# Patient Record
Sex: MALE
[De-identification: names, ages, dates, MRNs, and addresses within clinical notes are randomized per-mention and may not be internally consistent; named-entity substitution may affect disease eponyms.]

---

## 2019-02-05 ENCOUNTER — HOSPITAL ENCOUNTER (EMERGENCY)
Dept: HOSPITAL 97 - ER | Age: 24
Discharge: HOME | End: 2019-02-05
Payer: SELF-PAY

## 2019-02-05 DIAGNOSIS — L50.9: Primary | ICD-10-CM

## 2019-02-05 PROCEDURE — 99283 EMERGENCY DEPT VISIT LOW MDM: CPT

## 2019-02-05 NOTE — ER
Nurse's Notes                                                                                     

 [facility]                                                                

Name: Ramiro Gallego                                                                              

Age: 23 yrs                                                                                       

Sex: Male                                                                                         

: 1995                                                                                   

MRN: [id number]                                                                                   

Arrival Date: 2019                                                                          

Time: 16:12                                                                                       

Account#: [id number]                                                                            

Bed 19                                                                                            

Private MD:                                                                                       

Diagnosis: Urticaria                                                                              

                                                                                                  

Presentation:                                                                                     

                                                                                             

16:19 Presenting complaint: Patient states: Itchy rash that started yesterday all over body.  aj  

      Transition of care: patient was not received from another setting of care. Onset: The       

      symptoms/episode began/occurred suddenly. Anaphylaxis evaluation, the patient reports       

      or I have noted the following symptoms which indicate a significant risk of                 

      anaphylaxis:. Onset of symptoms was 2019. Risk Assessment: Do you want to      

      hurt yourself or someone else? Patient reports no desire to harm self or others.            

      Initial Sepsis Screen: Does the patient meet any 2 criteria? No. Patient's initial          

      sepsis screen is negative. Does the patient have a suspected source of infection? No.       

      Patient's initial sepsis screen is negative. Care prior to arrival: None.                   

16:19 Method Of Arrival: Ambulatory                                                             

16:19 Acuity: COOPER 4                                                                             

                                                                                                  

Triage Assessment:                                                                                

16:23 General: Appears in no apparent distress. comfortable, Behavior is calm, cooperative,   aj  

      appropriate for age. Pain: Denies pain. Neuro: Level of Consciousness is awake, alert,      

      obeys commands, Oriented to person, place, time, situation, Appropriate for age.            

      Respiratory: Airway is patent Respiratory effort is even, unlabored, Respiratory            

      pattern is regular, symmetrical. Derm: Skin is pink, warm \T\ dry. Rash noted that is       

      itchy, red.                                                                                 

                                                                                                  

Historical:                                                                                       

- Allergies:                                                                                      

16:23 No Known Allergies;                                                                     aj  

- Home Meds:                                                                                      

16:23 None [Active];                                                                          aj  

- PMHx:                                                                                           

16:23 None;                                                                                   aj  

                                                                                                  

- Immunization history:: Adult Immunizations up to date.                                          

- Social history:: Smoking status: Patient/guardian denies using tobacco.                         

- Ebola Screening: : Patient negative for fever greater than or equal to 101.5 degrees            

  Fahrenheit, and additional compatible Ebola Virus Disease symptoms Patient denies               

  exposure to infectious person Patient denies travel to an Ebola-affected area in the            

  21 days before illness onset No symptoms or risks identified at this time.                      

                                                                                                  

                                                                                                  

Screenin:37 Abuse screen: Denies threats or abuse. Denies injuries from another. Nutritional        sv  

      screening: No deficits noted. Tuberculosis screening: Fall Risk None identified.            

                                                                                                  

Assessment:                                                                                       

16:35 General: Appears in no apparent distress. comfortable, well developed, Behavior is      sv  

      calm, cooperative, appropriate for age. Pain: Denies pain. Neuro: Level of                  

      Consciousness is awake, alert, obeys commands, Oriented to person, place, time,             

      situation, Moves all extremities. Full function Speech is normal. Respiratory: Airway       

      is patent Respiratory effort is even, unlabored, Respiratory pattern is regular,            

      symmetrical. Derm: Rash noted that is itchy, urticaria, on diffuse.                         

                                                                                                  

Vital Signs:                                                                                      

16:23  / 92; Pulse 79; Resp 20; Temp 97.5; Pulse Ox 99% on R/A; Weight 78.93 kg; Height aj  

      5 ft. 11 in. (180.34 cm);                                                                   

16:23 Body Mass Index 24.27 (78.93 kg, 180.34 cm)                                             aj  

                                                                                                  

ED Course:                                                                                        

16:12 Patient arrived in ED.                                                                  rg4 

16:17 Rupa Carter FNP-C is [facility].                                                        kb  

16:17 Art Aldrich MD is Attending Physician.                                                kb  

16:22 Triage completed.                                                                       aj  

16:23 Arm band placed on left wrist. Patient placed in waiting room, Patient notified of wait aj  

      time.                                                                                       

16:33 Joanna Dennison, RN is Primary Nurse.                                                  sv  

16:37 Patient has correct armband on for positive identification. Bed in low position. Door   sv  

      closed.                                                                                     

17:07 No provider procedures requiring assistance completed. Patient did not have IV access   sv  

      during this emergency room visit.                                                           

                                                                                                  

Administered Medications:                                                                         

16:36 Drug: Pepcid 20 mg Route: PO;                                                           sv  

17:00 Follow up: Response: No adverse reaction                                                sv  

16:37 Drug: predniSONE 40 mg Route: PO;                                                       sv  

17:00 Follow up: Response: No adverse reaction                                                sv  

                                                                                                  

                                                                                                  

Outcome:                                                                                          

16:48 Discharge ordered by MD.                                                                kb  

17:07 Patient left the ED.                                                                    sv  

17:07 Discharged to home ambulatory.                                                          sv  

17:07 Condition: stable                                                                           

17:07 Discharge instructions given to patient, Instructed on discharge instructions, follow       

      up and referral plans. medication usage, Demonstrated understanding of instructions,        

      follow-up care, medications, Prescriptions given X 3.                                       

                                                                                                  

Signatures:                                                                                       

Rupa Carter FNP-C FNP-Joanna Crews RN RN sv Myers, Amanda, RN RN aj Garcia, Rubi                                 rg4                                                  

                                                                                                  

**************************************************************************************************

## 2019-02-05 NOTE — EDPHYS
Physician Documentation                                                                           

 [facility]                                                                

Name: Ramiro Gallego                                                                              

Age: 23 yrs                                                                                       

Sex: Male                                                                                         

: 1995                                                                                   

MRN: [id number]                                                                                   

Arrival Date: 2019                                                                          

Time: 16:12                                                                                       

Account#: [id number]                                                                            

Bed 19                                                                                            

Private MD:                                                                                       

ED Physician Art Aldrich                                                                         

HPI:                                                                                              

                                                                                             

16:40 This 23 yrs old  Male presents to ER via Ambulatory with complaints of Allergic kb  

      Reaction.                                                                                   

16:40 The patient presents with rash, that is diffuse. Onset: The symptoms/episode            kb  

      began/occurred yesterday. Associated signs and symptoms: Pertinent positives: hives,        

      Pertinent negatives: abdominal pain, Altered mental status chest pain, dysphagia,           

      fever, headache, Light headed nausea, rash, shortness of breath, swelling, Syncope          

      vomiting. 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         

      with something at work. At home the patient or guardian has treated the symptoms with       

      nothing. Severity of symptoms: At their worst the symptoms were moderate in the             

      emergency department the symptoms are unchanged. The patient has not experienced            

      similar symptoms in the past. The patient has not recently seen a physician.                

                                                                                                  

Historical:                                                                                       

- Allergies:                                                                                      

16:23 No Known Allergies;                                                                     aj  

- Home Meds:                                                                                      

16:23 None [Active];                                                                          aj  

- PMHx:                                                                                           

16:23 None;                                                                                   aj  

                                                                                                  

- Immunization history:: Adult Immunizations up to date.                                          

- Social history:: Smoking status: Patient/guardian denies using tobacco.                         

- Ebola Screening: : Patient negative for fever greater than or equal to 101.5 degrees            

  Fahrenheit, and additional compatible Ebola Virus Disease symptoms Patient denies               

  exposure to infectious person Patient denies travel to an Ebola-affected area in the            

  21 days before illness onset No symptoms or risks identified at this time.                      

                                                                                                  

                                                                                                  

ROS:                                                                                              

16:42 Constitutional: Negative for fever, chills, and weight loss, Cardiovascular: Negative   kb  

      for chest pain, palpitations, and edema, Respiratory: Negative for shortness of breath,     

      cough, wheezing, and pleuritic chest pain, Abdomen/GI: Negative for abdominal pain,         

      nausea, vomiting, diarrhea, and constipation, Back: Negative for injury and pain, :       

      Negative for injury, bleeding, discharge, and swelling, MS/Extremity: Negative for          

      injury and deformity, Neuro: Negative for headache, weakness, numbness, tingling, and       

      seizure.                                                                                    

16:42 Skin: Positive for rash, diffusely.                                                         

                                                                                                  

Exam:                                                                                             

16:42 Constitutional:  This is a well developed, well nourished patient who is awake, alert,  kb  

      and in no acute distress. Head/Face:  Normocephalic, atraumatic. Chest/axilla:  Normal      

      chest wall appearance and motion.  Nontender with no deformity.  No lesions are             

      appreciated. Cardiovascular:  Regular rate and rhythm with a normal S1 and S2.  No          

      gallops, murmurs, or rubs.  Normal PMI, no JVD.  No pulse deficits. Respiratory:  Lungs     

      have equal breath sounds bilaterally, clear to auscultation and percussion.  No rales,      

      rhonchi or wheezes noted.  No increased work of breathing, no retractions or nasal          

      flaring. Abdomen/GI:  Soft, non-tender, with normal bowel sounds.  No distension or         

      tympany.  No guarding or rebound.  No evidence of tenderness throughout. MS/ Extremity:     

       Pulses equal, no cyanosis.  Neurovascular intact.  Full, normal range of motion.           

      Neuro:  Awake and alert, GCS 15, oriented to person, place, time, and situation.            

      Cranial nerves II-XII grossly intact.  Motor strength 5/5 in all extremities.  Sensory      

      grossly intact.  Cerebellar exam normal.  Normal gait.                                      

16:42 Skin: consistent with  urticaria, and is diffusely located.                                 

                                                                                                  

Vital Signs:                                                                                      

16:23  / 92; Pulse 79; Resp 20; Temp 97.5; Pulse Ox 99% on R/A; Weight 78.93 kg; Height aj  

      5 ft. 11 in. (180.34 cm);                                                                   

16:23 Body Mass Index 24.27 (78.93 kg, 180.34 cm)                                               

                                                                                                  

MDM:                                                                                              

16:25 Patient medically screened.                                                             kb  

16:42 Data reviewed: vital signs, nurses notes. Data interpreted: Pulse oximetry: on room air kb  

      is 99 %. Interpretation: normal. Counseling: I had a detailed discussion with the           

      patient and/or guardian regarding: the historical points, exam findings, and any            

      diagnostic results supporting the discharge/admit diagnosis, the need for outpatient        

      follow up, a family practitioner, to return to the emergency department if symptoms         

      worsen or persist or if there are any questions or concerns that arise at home.             

                                                                                                  

Administered Medications:                                                                         

16:36 Drug: Pepcid 20 mg Route: PO;                                                           sv  

17:00 Follow up: Response: No adverse reaction                                                sv  

16:37 Drug: predniSONE 40 mg Route: PO;                                                       sv  

17:00 Follow up: Response: No adverse reaction                                                sv  

                                                                                                  

                                                                                                  

Disposition:                                                                                      

17:54 Co-signature as Attending Physician, Art Aldrich MD.                                    rn  

                                                                                                  

Disposition:                                                                                      

19 16:48 Discharged to Home. Impression: Urticaria.                                         

- Condition is Stable.                                                                            

- Discharge Instructions: Hives, Easy-to-Read.                                                    

- Prescriptions for Pepcid 20 mg Oral Tablet - take 1 tablet by ORAL route every 12               

  hours for 5 days; 10 tablet. Prednisone 20 mg Oral Tablet - take 1 tablet by ORAL               

  route once daily for 5 days; 5 tablet.                                                          

- Medication Reconciliation Form, Thank You Letter, Antibiotic Education, Prescription            

  Opioid Use form.                                                                                

- Follow up: Emergency Department; When: As needed; Reason: Worsening of condition.               

  Follow up: Private Physician; When: 2 - 3 days; Reason: Recheck today's complaints,             

  Continuance of care, Re-evaluation by your physician.                                           

                                                                                                  

                                                                                                  

                                                                                                  

Signatures:                                                                                       

Rupa Carter, CRISTAL-C                 CRISTAL-Joanna Cerws RN RN sv Myers, Amanda, RN RN aj Nieto, Roman, MD MD   rn                                                   

                                                                                                  

Corrections: (The following items were deleted from the chart)                                    

17:07 16:48 2019 16:48 Discharged to Home. Impression: Urticaria. Condition is Stable.  sv  

      Forms are Medication Reconciliation Form, Thank You Letter, Antibiotic Education,           

      Prescription Opioid Use. Follow up: Emergency Department; When: As needed; Reason:          

      Worsening of condition. Follow up: Private Physician; When: 2 - 3 days; Reason: Recheck     

      today's complaints, Continuance of care, Re-evaluation by your physician. kb                

                                                                                                  

**************************************************************************************************

## 2020-12-27 NOTE — ER
Nurse's Notes                                                                                     

 [facility]                                                                 

Name: Ramiro Gallego                                                                              

Age: 25 yrs                                                                                       

Sex: Male                                                                                         

: 1995                                                                                   

MRN: [id number]                                                                                   

Arrival Date: 2020                                                                          

Time: 11:31                                                                                       

Account#: [id number]                                                                            

Bed 30                                                                                            

Private MD:                                                                                       

Diagnosis:                                                                                        

                                                                                                  

Presentation:                                                                                     

                                                                                             

12:50 Chief complaint: Patient states: Abscess LL tooth x 2 weeks. Lips feels numb now,       ca1 

      swelling and pain on L side of face. Coronavirus screen: Client denies travel out of        

      the [place] in the last 14 days. At this time, the client does not indicate any symptoms       

      associated with coronavirus-19. Ebola Screen: Patient negative for fever greater than       

      or equal to 101.5 degrees Fahrenheit, and additional compatible Ebola Virus Disease         

      symptoms Patient denies exposure to infectious person. Patient denies travel to an          

      Ebola-affected area in the 21 days before illness onset. No symptoms or risks               

      identified at this time. Initial Sepsis Screen: Does the patient meet any 2 criteria?       

      No. Patient's initial sepsis screen is negative. Does the patient have a suspected          

      source of infection? No. Patient's initial sepsis screen is negative. Risk Assessment:      

      Do you want to hurt yourself or someone else? Patient reports no desire to harm self or     

      others. Onset of symptoms was 2020.                                            

12:50 Method Of Arrival: Ambulatory                                                           ca1 

12:50 Acuity: COOPER 4                                                                           ca1 

                                                                                                  

Historical:                                                                                       

- Allergies:                                                                                      

12:52 No Known Allergies;                                                                     ca1 

- Home Meds:                                                                                      

12:52 None [Active];                                                                          ca1 

- PMHx:                                                                                           

12:52 None;                                                                                   ca1 

                                                                                                  

- Immunization history:: Adult Immunizations up to date, Flu vaccine is not up to date.           

- Social history:: Smoking status: Patient reports the use of cigarette tobacco                   

  products, denies chronic smoking, but will smoke occasionally.                                  

                                                                                                  

                                                                                                  

Vital Signs:                                                                                      

12:50  / 96; Pulse 61; Resp 16 S; Temp 98.1(TE); Pulse Ox 97% on R/A; Weight 79.38 kg   ca1 

      (R); Height 5 ft. 11 in. (180.34 cm) (R); Pain 7/10;                                        

12:50 Body Mass Index 24.41 (79.38 kg, 180.34 cm)                                             ca1 

                                                                                                  

ED Course:                                                                                        

11:31 Patient arrived in ED.                                                                  as  

12:51 Triage completed.                                                                       ca1 

12:52 Arm band placed on right wrist.                                                         ca1 

15:02 Berenice Noel, RN is Primary Nurse.                                                   iw  

15:02 Freddy Lundy PA is PHCP.                                                                cp  

15:02 Art Aldrich MD is Attending Physician.                                                cp  

                                                                                                  

Administered Medications:                                                                         

No medications were administered                                                                  

                                                                                                  

                                                                                                  

Outcome:                                                                                          

15:02 Eloped                                                                                  iw  

15:03 Patient left the ED.                                                                    iw  

                                                                                                  

Signatures:                                                                                       

Loree Leon                             as                                                   

Berenice Noel, RN                     RN   iw                                                   

Freddy Lundy PA                         PA   cp                                                   

Margaret Child RN                        RN   ca1                                                  

                                                                                                  

Corrections: (The following items were deleted from the chart)                                    

12:53 12:50 Resp 16bpm; Spontaneous; 79.38 kg Reported; Height 5 ft. 11 in. Reported; BMI:    ca1 

      24.4; Pain 7/10; ca1                                                                        

                                                                                                  

**************************************************************************************************

## 2020-12-28 NOTE — EDPHYS
Physician Documentation                                                                           

 [facility]                                                                 

Name: Ramiro Gallego                                                                              

Age: 25 yrs                                                                                       

Sex: Male                                                                                         

: 1995                                                                                   

MRN: [id number]                                                                                   

Arrival Date: 2020                                                                          

Time: 18:45                                                                                       

Account#: [id number]                                                                            

Bed 28                                                                                            

Private MD:                                                                                       

ED Physician Freddy Jacobson                                                                      

HPI:                                                                                              

                                                                                             

21:06 This 25 yrs old  Male presents to ER via Ambulatory with complaints of          cp  

      Toothache.                                                                                  

21:06 The patient presents with pain. The problem is located in the left lower jaw. Onset:    cp  

      The symptoms/episode began/occurred 3 week(s) ago. Duration: The symptoms are               

      continuous, and are steadily getting worse.                                                 

21:06 Associated signs and symptoms: Pertinent negatives: dysphagia, fever, inability to eat. cp  

      Severity of symptoms: in the emergency department the symptoms are unchanged, despite       

      home interventions.                                                                         

                                                                                                  

Historical:                                                                                       

- Allergies:                                                                                      

19:04 No Known Allergies;                                                                     ll1 

- PMHx:                                                                                           

19:04 pectus exavatum;                                                                        ll1 

19:05 scoliosis;                                                                              ll1 

- PSHx:                                                                                           

19:05 Appendectomy;                                                                           ll1 

                                                                                                  

- Immunization history:: Flu vaccine is not up to date.                                           

- Social history:: Smoking status: Patient reports the use of cigarette tobacco                   

  products, denies chronic smoking, but will smoke occasionally.                                  

                                                                                                  

                                                                                                  

ROS:                                                                                              

21:07 Constitutional: Negative for body aches, chills, fever, poor PO intake.                 cp  

21:07 ENT: Positive for dental pain, Negative for ear pain, sore throat, difficulty           cp  

      swallowing, difficulty handling secretions.                                                 

21:07 Respiratory: Negative for cough, shortness of breath, wheezing.                             

21:07 Abdomen/GI: Negative for abdominal pain, nausea, vomiting, and diarrhea.                    

21:07 Skin: Negative for rash.                                                                    

21:07 Neuro: Negative for altered mental status, headache.                                        

21:07 All other systems are negative.                                                             

                                                                                                  

Exam:                                                                                             

21:07 Constitutional: The patient appears in no acute distress, alert, awake, non-toxic, well cp  

      developed, well nourished.                                                                  

21:07 Head/face: Noted is swelling, that is mild, of the  left lower jaw.                         

21:07 ENT: External ear(s): are unremarkable, Nose: is normal, Mouth: Lips: moist, Oral           

      mucosa: moist, Posterior pharynx: Airway: no evidence of obstruction, patent, Tonsils:      

      are normal in appearance, Dental exam: abscess, is not appreciated, dental caries, that     

      is moderate, diffusely, pain, that is mild, specifically in the  lower left second          

      bicuspid (#20), Voice: is normal.                                                           

21:07 Neck: ROM/movement: is normal, is supple, without pain, no range of motions limitations.    

21:07 Chest/axilla: Inspection: normal.                                                           

21:07 Cardiovascular: Rate: bradycardic, Rhythm: regular.                                     cp  

21:07 Respiratory: the patient does not display signs of respiratory distress,  Respirations:     

      normal, no use of accessory muscles, labored breathing, is not present.                     

21:07 Skin: no rash present.                                                                      

                                                                                                  

Vital Signs:                                                                                      

19:05  / 70; Pulse 69; Resp 17; Temp 98.5; Pulse Ox 99% ; Weight 79.38 kg; Height 5 ft. ll1 

      11 in. (180.34 cm); Pain 6/10;                                                              

20:33 BP 99 / 60 LA (auto/reg); Pulse 54; Pulse Ox 100% on R/A; Pain 6/10;                    jp3 

19:05 Body Mass Index 24.41 (79.38 kg, 180.34 cm)                                             ll1 

                                                                                                  

MDM:                                                                                              

20:42 Patient medically screened.                                                             cp  

21:10 Differential diagnosis: dental caries, dental abscess, pericoronitis.                   cp  

21:10 Data reviewed: vital signs, nurses notes, and as a result, I will discharge patient.    cp  

      Counseling: I had a detailed discussion with the patient and/or guardian regarding: the     

      historical points, exam findings, and any diagnostic results supporting the                 

      discharge/admit diagnosis, the need for outpatient follow up, for definitive care, a        

      dentist, to return to the emergency department if symptoms worsen or persist or if          

      there are any questions or concerns that arise at home.                                     

                                                                                                  

Administered Medications:                                                                         

21:11 Drug: Ibuprofen 800 mg Route: PO;                                                       sg  

21:43 Follow up: Response: No adverse reaction                                                sg  

21:12 Drug: Clindamycin 600 mg Route: PO;                                                     sg  

21:20 Follow up: Response: No adverse reaction                                                sg  

                                                                                                  

                                                                                                  

Disposition:                                                                                      

21:25 Chart complete.                                                                         cp  

22:50 Co-signature as Attending Physician, Freddy Jacobson MD I agree with the assessment and  joanna 

      plan of care.                                                                               

                                                                                                  

Disposition:                                                                                      

20 21:10 Discharged to Home. Impression: Disorder of teeth and supporting structures,       

  unspecified.                                                                                    

- Condition is Stable.                                                                            

- Discharge Instructions: Dental Pain.                                                            

- Prescriptions for Clindamycin HCl 300 mg Oral Capsule - take 1 capsule by ORAL route            

  every 6 hours for 10 days; 40 capsule. Ibuprofen 800 mg Oral Tablet - take 1 tablet             

  by ORAL route every 8 hours As needed take with food; 30 tablet.                                

- Work release form, Medication Reconciliation Form, Thank You Letter, Antibiotic                 

  Education, Prescription Opioid Use form.                                                        

- Follow up: Private Physician; When: 2 - 3 days; Reason: Recheck today's complaints.             

- Problem is new.                                                                                 

- Symptoms have improved.                                                                         

                                                                                                  

                                                                                                  

                                                                                                  

Signatures:                                                                                       

Nathan Pan RN                         RN   Freddy Rawls MD MD cha Page, Corey, PA PA   cp                                                   

Destini Conrad RN                       RN   ll1                                                  

                                                                                                  

Corrections: (The following items were deleted from the chart)                                    

21:21 21:10 2020 21:10 Discharged to Home. Impression: Disorder of teeth and supporting sg  

      structures, unspecified. Condition is Stable. Forms are Medication Reconciliation Form,     

      Thank You Letter, Antibiotic Education, Prescription Opioid Use. Follow up: Private         

      Physician; When: 2 - 3 days; Reason: Recheck today's complaints. Problem is new.            

      Symptoms have improved. cp                                                                  

                                                                                                  

**************************************************************************************************

## 2020-12-28 NOTE — ER
Nurse's Notes                                                                                     

 [facility]                                                                 

Name: Ramiro Gallego                                                                              

Age: 25 yrs                                                                                       

Sex: Male                                                                                         

: 1995                                                                                   

MRN: [id number]                                                                                   

Arrival Date: 2020                                                                          

Time: 18:45                                                                                       

Account#: [id number]                                                                            

Bed 28                                                                                            

Private MD:                                                                                       

Diagnosis: Disorder of teeth and supporting structures, unspecified                               

                                                                                                  

Presentation:                                                                                     

                                                                                             

19:05 Chief complaint: Patient states: Left lower jaw pain and swelling for 2 days. No fever. ll1 

      Lower jaw is swollen on the outside. Coronavirus screen: Client denies travel out of        

      the [place] in the last 14 days. At this time, the client does not indicate any symptoms       

      associated with coronavirus-19. Ebola Screen: Patient denies travel to an                   

      Ebola-affected area in the 21 days before illness onset. Initial Sepsis Screen: Does        

      the patient meet any 2 criteria? No. Patient's initial sepsis screen is negative. Does      

      the patient have a suspected source of infection? Yes: Other: teeth. Risk Assessment:       

      Do you want to hurt yourself or someone else? Patient reports no desire to harm self or     

      others. Onset of symptoms was 2020.                                            

19:05 Method Of Arrival: Ambulatory                                                           ll1 

19:05 Acuity: COOPER 4                                                                           ll1 

                                                                                                  

Historical:                                                                                       

- Allergies:                                                                                      

19:04 No Known Allergies;                                                                     ll1 

- PMHx:                                                                                           

19:04 pectus exavatum;                                                                        ll1 

19:05 scoliosis;                                                                              ll1 

- PSHx:                                                                                           

19:05 Appendectomy;                                                                           ll1 

                                                                                                  

- Immunization history:: Flu vaccine is not up to date.                                           

- Social history:: Smoking status: Patient reports the use of cigarette tobacco                   

  products, denies chronic smoking, but will smoke occasionally.                                  

                                                                                                  

                                                                                                  

Screenin:35 Abuse screen: Denies threats or abuse. Denies injuries from another. Nutritional        sg  

      screening: No deficits noted. Tuberculosis screening: No symptoms or risk factors           

      identified. Never had TB. Fall Risk None identified.                                        

                                                                                                  

Assessment:                                                                                       

20:35 General: Appears in no apparent distress. well groomed, well developed, well nourished, sg  

      Behavior is calm, cooperative, appropriate for age. Pain: Complains of pain in right        

      jaw Quality of pain is described as aching. Neuro: Level of Consciousness is awake,         

      alert, obeys commands, Oriented to person, place, time, Speech is normal, Facial            

      symmetry appears normal. Cardiovascular: Patient's skin is warm and dry. Chest pain is      

      denied. Respiratory: Airway is patent Respiratory effort is even, unlabored,                

      Respiratory pattern is regular, symmetrical. GI: No signs and/or symptoms were reported     

      involving the gastrointestinal system. : No signs and/or symptoms were reported           

      regarding the genitourinary system. Derm: Skin is pink, warm \T\ dry. Musculoskeletal:      

      Circulation, motion, and sensation intact. Range of motion: intact in all extremities.      

                                                                                                  

Vital Signs:                                                                                      

19:05  / 70; Pulse 69; Resp 17; Temp 98.5; Pulse Ox 99% ; Weight 79.38 kg; Height 5 ft. ll1 

      11 in. (180.34 cm); Pain 6/10;                                                              

20:33 BP 99 / 60 LA (auto/reg); Pulse 54; Pulse Ox 100% on R/A; Pain 6/10;                    jp3 

19:05 Body Mass Index 24.41 (79.38 kg, 180.34 cm)                                             ll1 

                                                                                                  

ED Course:                                                                                        

18:45 Patient arrived in ED.                                                                  rg4 

19:04 Arm band placed on.                                                                     ll1 

19:06 Triage completed.                                                                       ll1 

20:33 Call light in reach. Verbal reassurance given. Pulse ox on. NIBP on.                    jp3 

20:33 Patient maintains SpO2 saturation greater than 95% on room air.                         jp3 

20:38 [facility]                                                                cp  

20:38 Freddy Jacobson MD is Attending Physician.                                             cp  

20:54 Nathan Pan RN is Primary Nurse.                                                       sg  

21:10 No provider procedures requiring assistance completed. Patient did not have IV access   sg  

      during this emergency room visit.                                                           

                                                                                                  

Administered Medications:                                                                         

21:11 Drug: Ibuprofen 800 mg Route: PO;                                                       sg  

21:43 Follow up: Response: No adverse reaction                                                sg  

21:12 Drug: Clindamycin 600 mg Route: PO;                                                     sg  

21:20 Follow up: Response: No adverse reaction                                                sg  

                                                                                                  

                                                                                                  

Outcome:                                                                                          

21:10 Discharge ordered by MD.                                                                cp  

21:10 Discharged to home ambulatory, with family.                                             sg  

21:10 Condition: good                                                                             

21:10 Discharge instructions given to patient, Instructed on discharge instructions, follow       

      up and referral plans. no drinking with medication, no driving heavy equipment,             

      medication usage, safety practices, Demonstrated understanding of instructions,             

      follow-up care, medications, Prescriptions given X 2.                                       

21:21 Patient left the ED.                                                                    sg  

                                                                                                  

Signatures:                                                                                       

Nathan Pan RN RN sg Page, Corey, PA PA cp Garcia, Basilia                                 rg4                                                  

Matthew Teran                              jp3                                                  

Destini Conrad, RN                       RN   ll1                                                  

                                                                                                  

**************************************************************************************************

## 2022-05-28 NOTE — EDPHYS
Physician Documentation                                                                           

 [facility]                                                                 

Name: Ramiro Gallego                                                                              

Age: 26 yrs                                                                                       

Sex: Male                                                                                         

: 1995                                                                                   

MRN: [id number]                                                                                   

Arrival Date: 2022                                                                          

Time: 22:42                                                                                       

Account#: [id number]                                                                            

Bed 5                                                                                             

Private MD:                                                                                       

ED Physician Art Aldrich                                                                         

HPI:                                                                                              

                                                                                             

23:19 This 26 yrs old  Male presents to ER via Ambulatory with complaints of Leg      rn  

      Injury.                                                                                     

23:19 The patient presents with decreased range of motion, an injury, pain. The complaints    rn  

      affect the right knee. Onset: The symptoms/episode began/occurred just prior to             

      arrival. Modifying factors: The symptoms are alleviated by elevating leg, remaining         

      still, the symptoms are aggravated by movement, weight bearing, bending knee.               

      Associated signs and symptoms: Pertinent negatives fever, numbness, tingling, warmth,       

      weakness. Severity of symptoms: At their worst the symptoms were moderate, in the           

      emergency department the symptoms are unchanged. The patient has not experienced            

      similar symptoms in the past. The patient has not recently seen a physician. Pt reports     

      was at concert, 2 men fell onto his leg as he was standing, heard 2 pops, can only limp     

      on right leg at level of knee. Reports only medial right knee hurts. No swelling. No        

      discoloration. Has never injured that knee before. .                                        

                                                                                                  

Historical:                                                                                       

- Allergies:                                                                                      

22:46 No Known Allergies;                                                                     ld1 

- PMHx:                                                                                           

22:46 pectus exavatum; scoliosis;                                                             ld1 

- PSHx:                                                                                           

22:46 None;                                                                                   ld1 

                                                                                                  

- Immunization history:: Adult Immunizations up to date, Client reports having NOT                

  received the Covid vaccine.                                                                     

- Social history:: Smoking status: Patient reports the use of cigarette tobacco                   

  products, smokes one-half pack cigarettes per day, Patient uses alcohol, occasionally.          

- Family history:: not pertinent.                                                                 

- Hospitalizations: : No recent hospitalization is reported.                                      

                                                                                                  

                                                                                                  

ROS:                                                                                              

23:19 Constitutional: Negative for fever, chills, and weight loss, MS/Extremity: + injury and rn  

      pain to right knee Skin: Negative for injury, rash, and discoloration, Neuro: Negative      

      for weakness, numbness, tingling                                                            

                                                                                                  

Exam:                                                                                             

23:22 Constitutional:  This is a well developed, well nourished patient who is awake, alert,  rn  

      appears uncomfortable, firend helping him to and from restroom MS/ Extremity:  Pulses       

      equal, no cyanosis.  Neurovascular intact.  Painful ROM right knee with tenderness          

      medial right knee. No bony tenderness of patella/femur/proximal tib/fib.                    

                                                                                                  

Vital Signs:                                                                                      

22:44  / 81; Pulse 80; Resp 18; Temp 97.6(TE); Pulse Ox 99% on R/A; Weight 92.99 kg;    ld1 

      Height 5 ft. 11 in. (180.34 cm); Pain 8/10;                                                 

22:44 Body Mass Index 28.59 (92.99 kg, 180.34 cm)                                             ld1 

                                                                                                  

MDM:                                                                                              

22:51 Patient medically screened.                                                             rn  

23:04 Refusal of service: The patient/guardian displays adequate decision making capability   rn  

      and despite a detailed discussion of alternatives, benefits, risks, and consequences        

      refuses: all X-rays.                                                                        

23:22 Differential diagnosis: closed fracture, sprain/strain. Data reviewed: vital signs,     rn  

      nurses notes, and as a result, I will discharge patient. Counseling: I had a detailed       

      discussion with the patient and/or guardian regarding: the historical points, exam          

      findings, and any diagnostic results supporting the discharge/admit diagnosis, the need     

      for outpatient follow up, to return to the emergency department if symptoms worsen or       

      persist or if there are any questions or concerns that arise at home. Response to           

      treatment: the patient's symptoms have mildly improved after treatment, and as a            

      result, I will discharge patient. Special discussion: I discussed with the                  

      patient/guardian in detail that at this point there is no indication for admission to       

      the hospital. It is understood, however, that if the symptoms persist or worsen the         

      patient needs to return immediately for re-evaluation. Further emergent ED testing is       

      not indicated at this point in time. I discussed with the patient/guardian in detail        

      the need to arrange with the PCP or specialist further outpatient testing, MRI, Based       

      on the history and exam findings, there is no indication for further emergent testing       

      or inpatient evaluation. I discussed with the patient/guardian the need to see the          

      orthopedic surgeon for further evaluation of the symptoms. ED course: Explained to          

      patient that most likely has ligamentous injury and possible meniscus injury due to         

      medial force on knee while leg planted. Also explained may have fracture. Patient           

      refuses xray, states "knows it is not broken". When explained that we do not have MRI       

      availability currently, he requests brace and states he is going home and will f/u for      

      MRI. Friend is in room also convincing him to leave and not "waste money" on xray.          

      Understands risks of leaving without further evaluation. Also refuses crutches. .           

                                                                                                  

                                                                                             

23:04 Order name: Knee Immobilizer; Complete Time: 23:20                                      rn  

                                                                                                  

Administered Medications:                                                                         

No medications were administered                                                                  

                                                                                                  

                                                                                                  

Disposition Summary:                                                                              

22 23:26                                                                                    

Discharge Ordered                                                                                 

      Location: Home                                                                          rn  

      Problem: new                                                                            rn  

      Symptoms: have improved                                                                 rn  

      Condition: Stable                                                                       rn  

      Diagnosis                                                                                   

        - Pain in right knee                                                                  rn  

      Followup:                                                                               rn  

        - With: Hood Kellogg MD                                                                

        - When: As needed                                                                          

        - Reason: Recheck today's complaints, Re-evaluation by your physician                      

      Discharge Instructions:                                                                     

        - Discharge Summary Sheet                                                             rn  

        - Joint Pain                                                                          rn  

        - Acute Knee Pain, Adult                                                              rn  

      Forms:                                                                                      

        - Medication Reconciliation Form                                                      rn  

        - Thank You Letter                                                                    rn  

        - Antibiotic Education                                                                rn  

        - Prescription Opioid Use                                                             rn  

Signatures:                                                                                       

Dispatcher [employer]                           Art Alexander MD MD rn Dibbern, Lauren, RN                     RN   ld1                                                  

                                                                                                  

Corrections: (The following items were deleted from the chart)                                    

23:14 22:48 Knee Right 3 View+RAD.RAD.BRZ ordered. EDMS                                       EDMS

                                                                                                  

**************************************************************************************************

## 2022-05-28 NOTE — ER
Nurse's Notes                                                                                     

 [facility]                                                                 

Name: Ramiro Gallego                                                                              

Age: 26 yrs                                                                                       

Sex: Male                                                                                         

: 1995                                                                                   

MRN: [id number]                                                                                   

Arrival Date: 2022                                                                          

Time: 22:42                                                                                       

Account#: [id number]                                                                            

Bed 5                                                                                             

Private MD:                                                                                       

Diagnosis: Pain in right knee                                                                     

                                                                                                  

Presentation:                                                                                     

                                                                                             

22:44 Chief complaint: Patient states: I was at a concert tonight and someone fell on top of  ld1 

      my leg. C/O right knee pain. Pt reports hearing hearing several loud pops in right          

      knee. Coronavirus screen: At this time, the client does not indicate any symptoms           

      associated with coronavirus-19. Ebola Screen: No symptoms or risks identified at this       

      time. Initial Sepsis Screen: Does the patient meet any 2 criteria? No. Patient's            

      initial sepsis screen is negative. Does the patient have a suspected source of              

      infection? No. Patient's initial sepsis screen is negative. Risk Assessment: Do you         

      want to hurt yourself or someone else? Patient reports no desire to harm self or            

      others. Onset of symptoms was May 28, 2022.                                                 

22:44 Method Of Arrival: Ambulatory                                                           ld1 

22:44 Acuity: COOPER 4                                                                           ld1 

                                                                                                  

Triage Assessment:                                                                                

22:46 General: Appears in no apparent distress. comfortable, Behavior is calm, cooperative,   ld1 

      appropriate for age. Pain: Complains of pain in right leg Pain does not radiate. Pain       

      currently is 8 out of 10 on a pain scale. EENT: No signs and/or symptoms were reported      

      regarding the EENT system. Neuro: Level of Consciousness is awake, alert, obeys             

      commands, Oriented to person, place, time, situation. Cardiovascular: Capillary refill      

      < 3 seconds Patient's skin is warm and dry. Respiratory: Airway is patent Respiratory       

      effort is even, unlabored. GI: Abdomen is flat, non-distended. : No signs and/or          

      symptoms were reported regarding the genitourinary system. Derm: No signs and/or            

      symptoms reported regarding the dermatologic system. Musculoskeletal: Reports pain in       

      right leg.                                                                                  

                                                                                                  

Historical:                                                                                       

- Allergies:                                                                                      

22:46 No Known Allergies;                                                                     ld1 

- PMHx:                                                                                           

22:46 pectus exavatum; scoliosis;                                                             ld1 

- PSHx:                                                                                           

22:46 None;                                                                                   ld1 

                                                                                                  

- Immunization history:: Adult Immunizations up to date, Client reports having NOT                

  received the Covid vaccine.                                                                     

- Social history:: Smoking status: Patient reports the use of cigarette tobacco                   

  products, smokes one-half pack cigarettes per day, Patient uses alcohol, occasionally.          

- Family history:: not pertinent.                                                                 

- Hospitalizations: : No recent hospitalization is reported.                                      

                                                                                                  

                                                                                                  

Screenin:04 Abuse screen: Denies threats or abuse. Nutritional screening: No deficits noted.        jb4 

      Tuberculosis screening: No symptoms or risk factors identified. Fall Risk None              

      identified.                                                                                 

                                                                                                  

Assessment:                                                                                       

23:03 General: Appears in no apparent distress. comfortable, Behavior is calm, cooperative,   jb4 

      appropriate for age. Pain: Complains of pain in right leg Pain does not radiate. Pain       

      currently is 8 out of 10 on a pain scale. Neuro: Level of Consciousness is awake,           

      alert, obeys commands, Oriented to person, place, time, situation. Cardiovascular:          

      Patient's skin is warm and dry. Respiratory: Airway is patent Respiratory effort is         

      even, unlabored, Respiratory pattern is regular, symmetrical. GI: No signs and/or           

      symptoms were reported involving the gastrointestinal system. : No signs and/or           

      symptoms were reported regarding the genitourinary system. EENT: No signs and/or            

      symptoms were reported regarding the EENT system. Derm: Skin is intact, Skin is pink,       

      warm \T\ dry. Musculoskeletal: Circulation, motion, and sensation intact. Range of          

      motion: intact in all extremities.                                                          

                                                                                                  

Vital Signs:                                                                                      

22:44  / 81; Pulse 80; Resp 18; Temp 97.6(TE); Pulse Ox 99% on R/A; Weight 92.99 kg;    ld1 

      Height 5 ft. 11 in. (180.34 cm); Pain 8/10;                                                 

22:44 Body Mass Index 28.59 (92.99 kg, 180.34 cm)                                             ld1 

                                                                                                  

ED Course:                                                                                        

22:42 Patient arrived in ED.                                                                  bp1 

22:46 Triage completed.                                                                       ld1 

22:46 Arm band placed on right wrist.                                                         ld1 

22:49 Gaston Matta RN is Primary Nurse.                                                     jb4 

22:51 Art Aldrich MD is Attending Physician.                                                rn  

23:04 Patient has correct armband on for positive identification. Bed in low position. Call   jb4 

      light in reach. Side rails up X 1. Client placed on continuous cardiac and pulse            

      oximetry monitoring. NIBP monitoring applied.                                               

23:25 Hood Kellogg MD is Referral Physician.                                              rn  

23:26 No provider procedures requiring assistance completed. Patient did not have IV access   jb4 

      during this emergency room visit.                                                           

                                                                                                  

Administered Medications:                                                                         

No medications were administered                                                                  

                                                                                                  

                                                                                                  

Outcome:                                                                                          

: Discharge ordered by MD.                                                                rn  

23:26 Discharged to home via wheelchair, with friend.                                         jb4 

: Condition: stable                                                                           

23:26 Discharge instructions given to Left prior to receiving discharge packet.                   

23:26 Patient left the ED.                                                                    jb4 

                                                                                                  

Signatures:                                                                                       

Art Aldrich MD MD rn Bryson, James, RN                       RN   jb4                                                  

Celestina Frazier Lauren, RN                     RN   ld1                                                  

                                                                                                  

**************************************************************************************************